# Patient Record
Sex: MALE | Race: ASIAN | NOT HISPANIC OR LATINO | Employment: FULL TIME | URBAN - METROPOLITAN AREA
[De-identification: names, ages, dates, MRNs, and addresses within clinical notes are randomized per-mention and may not be internally consistent; named-entity substitution may affect disease eponyms.]

---

## 2023-09-05 ENCOUNTER — OFFICE VISIT (OUTPATIENT)
Dept: FAMILY MEDICINE CLINIC | Facility: CLINIC | Age: 38
End: 2023-09-05
Payer: COMMERCIAL

## 2023-09-05 VITALS
OXYGEN SATURATION: 97 % | HEART RATE: 52 BPM | WEIGHT: 208 LBS | BODY MASS INDEX: 29.12 KG/M2 | HEIGHT: 71 IN | DIASTOLIC BLOOD PRESSURE: 66 MMHG | SYSTOLIC BLOOD PRESSURE: 104 MMHG | TEMPERATURE: 98.1 F

## 2023-09-05 DIAGNOSIS — E78.5 HYPERLIPIDEMIA, UNSPECIFIED HYPERLIPIDEMIA TYPE: ICD-10-CM

## 2023-09-05 DIAGNOSIS — Z00.00 ENCOUNTER FOR MEDICAL EXAMINATION TO ESTABLISH CARE: ICD-10-CM

## 2023-09-05 DIAGNOSIS — Z00.00 ANNUAL PHYSICAL EXAM: Primary | ICD-10-CM

## 2023-09-05 DIAGNOSIS — Z00.00 ENCOUNTER FOR PREVENTIVE CARE: ICD-10-CM

## 2023-09-05 PROCEDURE — 99385 PREV VISIT NEW AGE 18-39: CPT | Performed by: STUDENT IN AN ORGANIZED HEALTH CARE EDUCATION/TRAINING PROGRAM

## 2023-09-05 NOTE — PROGRESS NOTES
"Chief Complaint  Annual Exam and Establish Care    Subjective        Ian Gerardo presents to [facility]  History of Present Illness  First time to practice  Establishing medical care  Annual physical  Doesnot have any new complaint  Hx of abnormal lipid profile but was never on any meds  Currently not on any meds.  Review of system is negative for fever, headache, chest pain, shortness of breath, palpitation, nausea, vomiting, any recent change in bladder habits.      Objective   Vital Signs:  /66   Pulse 52   Temp 98.1 °F (36.7 °C) (Oral)   Ht 180.3 cm (71\")   Wt 94.3 kg (208 lb)   SpO2 97%   BMI 29.01 kg/m²   Estimated body mass index is 29.01 kg/m² as calculated from the following:    Height as of this encounter: 180.3 cm (71\").    Weight as of this encounter: 94.3 kg (208 lb).             Physical Exam  HENT:      Head: Normocephalic and atraumatic.      Mouth/Throat:      Mouth: Mucous membranes are moist.      Pharynx: Oropharynx is clear.   Eyes:      Extraocular Movements: Extraocular movements intact.      Conjunctiva/sclera: Conjunctivae normal.      Pupils: Pupils are equal, round, and reactive to light.   Cardiovascular:      Rate and Rhythm: Normal rate and regular rhythm.   Pulmonary:      Effort: Pulmonary effort is normal.      Breath sounds: Normal breath sounds.   Abdominal:      General: Bowel sounds are normal.      Palpations: Abdomen is soft.   Musculoskeletal:         General: Normal range of motion.      Cervical back: Neck supple.   Skin:     General: Skin is warm.      Capillary Refill: Capillary refill takes less than 2 seconds.   Neurological:      General: No focal deficit present.      Mental Status: He is alert and oriented to person, place, and time. Mental status is at baseline.   Psychiatric:         Mood and Affect: Mood normal.      Result Review :                   Assessment and Plan   Diagnoses and all orders for this " visit:    1. Annual physical exam (Primary)  Comments:  fasting labs ordered, will follow  Orders:  -     CBC Auto Differential  -     Comprehensive Metabolic Panel  -     Lipid Panel With / Chol / HDL Ratio  -     TSH    2. Encounter for medical examination to establish care  -     CBC Auto Differential  -     Comprehensive Metabolic Panel  -     Lipid Panel With / Chol / HDL Ratio  -     TSH    3. Hyperlipidemia, unspecified hyperlipidemia type  Comments:  lipid panel ordered, will follow, recommend lifestyle modification, low fat diet and exercise    4. Encounter for preventive care  Comments:  advise TDAp shot    Patient encouraged to partake of healthy diet rich in fresh fruits and vegetables as well as lean proteins.  Patient encouraged to participate in daily exercise with goal of 30 min sustained activity.  Wear seatbelt when driving  Flu shot annually           Follow Up   No follow-ups on file.  Patient was given instructions and counseling regarding his condition or for health maintenance advice. Please see specific information pulled into the AVS if appropriate.

## 2023-09-07 LAB
ALBUMIN SERPL-MCNC: 4.9 G/DL (ref 3.5–5.2)
ALBUMIN/GLOB SERPL: 2.1 G/DL
ALP SERPL-CCNC: 92 U/L (ref 39–117)
ALT SERPL-CCNC: 21 U/L (ref 1–41)
AST SERPL-CCNC: 20 U/L (ref 1–40)
BASOPHILS # BLD AUTO: 0.04 10*3/MM3 (ref 0–0.2)
BASOPHILS NFR BLD AUTO: 0.5 % (ref 0–1.5)
BILIRUB SERPL-MCNC: 0.7 MG/DL (ref 0–1.2)
BUN SERPL-MCNC: 15 MG/DL (ref 6–20)
BUN/CREAT SERPL: 14 (ref 7–25)
CALCIUM SERPL-MCNC: 10.1 MG/DL (ref 8.6–10.5)
CHLORIDE SERPL-SCNC: 105 MMOL/L (ref 98–107)
CHOLEST SERPL-MCNC: 180 MG/DL (ref 0–200)
CHOLEST/HDLC SERPL: 5.29 {RATIO}
CO2 SERPL-SCNC: 25.4 MMOL/L (ref 22–29)
CREAT SERPL-MCNC: 1.07 MG/DL (ref 0.76–1.27)
EGFRCR SERPLBLD CKD-EPI 2021: 91.1 ML/MIN/1.73
EOSINOPHIL # BLD AUTO: 0.24 10*3/MM3 (ref 0–0.4)
EOSINOPHIL NFR BLD AUTO: 3.1 % (ref 0.3–6.2)
ERYTHROCYTE [DISTWIDTH] IN BLOOD BY AUTOMATED COUNT: 13.2 % (ref 12.3–15.4)
GLOBULIN SER CALC-MCNC: 2.3 GM/DL
GLUCOSE SERPL-MCNC: 101 MG/DL (ref 65–99)
HCT VFR BLD AUTO: 47.5 % (ref 37.5–51)
HDLC SERPL-MCNC: 34 MG/DL (ref 40–60)
HGB BLD-MCNC: 15.7 G/DL (ref 13–17.7)
IMM GRANULOCYTES # BLD AUTO: 0.04 10*3/MM3 (ref 0–0.05)
IMM GRANULOCYTES NFR BLD AUTO: 0.5 % (ref 0–0.5)
LDLC SERPL CALC-MCNC: 127 MG/DL (ref 0–100)
LYMPHOCYTES # BLD AUTO: 2.71 10*3/MM3 (ref 0.7–3.1)
LYMPHOCYTES NFR BLD AUTO: 35.3 % (ref 19.6–45.3)
MCH RBC QN AUTO: 28.9 PG (ref 26.6–33)
MCHC RBC AUTO-ENTMCNC: 33.1 G/DL (ref 31.5–35.7)
MCV RBC AUTO: 87.3 FL (ref 79–97)
MONOCYTES # BLD AUTO: 0.59 10*3/MM3 (ref 0.1–0.9)
MONOCYTES NFR BLD AUTO: 7.7 % (ref 5–12)
NEUTROPHILS # BLD AUTO: 4.06 10*3/MM3 (ref 1.7–7)
NEUTROPHILS NFR BLD AUTO: 52.9 % (ref 42.7–76)
NRBC BLD AUTO-RTO: 0 /100 WBC (ref 0–0.2)
PLATELET # BLD AUTO: 252 10*3/MM3 (ref 140–450)
POTASSIUM SERPL-SCNC: 4.8 MMOL/L (ref 3.5–5.2)
PROT SERPL-MCNC: 7.2 G/DL (ref 6–8.5)
RBC # BLD AUTO: 5.44 10*6/MM3 (ref 4.14–5.8)
SODIUM SERPL-SCNC: 140 MMOL/L (ref 136–145)
TRIGL SERPL-MCNC: 102 MG/DL (ref 0–150)
TSH SERPL DL<=0.005 MIU/L-ACNC: 1.25 UIU/ML (ref 0.27–4.2)
VLDLC SERPL CALC-MCNC: 19 MG/DL (ref 5–40)
WBC # BLD AUTO: 7.68 10*3/MM3 (ref 3.4–10.8)

## 2024-03-05 DIAGNOSIS — E78.5 HYPERLIPIDEMIA, UNSPECIFIED HYPERLIPIDEMIA TYPE: Primary | ICD-10-CM

## 2024-03-05 LAB
ALBUMIN SERPL-MCNC: 4.6 G/DL (ref 3.5–5.2)
ALBUMIN/GLOB SERPL: 1.9 G/DL
ALP SERPL-CCNC: 100 U/L (ref 39–117)
ALT SERPL-CCNC: 29 U/L (ref 1–41)
AST SERPL-CCNC: 21 U/L (ref 1–40)
BILIRUB SERPL-MCNC: 0.7 MG/DL (ref 0–1.2)
BUN SERPL-MCNC: 13 MG/DL (ref 6–20)
BUN/CREAT SERPL: 11.3 (ref 7–25)
CALCIUM SERPL-MCNC: 9.6 MG/DL (ref 8.6–10.5)
CHLORIDE SERPL-SCNC: 105 MMOL/L (ref 98–107)
CHOLEST SERPL-MCNC: 198 MG/DL (ref 0–200)
CHOLEST/HDLC SERPL: 5.82 {RATIO}
CO2 SERPL-SCNC: 22.8 MMOL/L (ref 22–29)
CREAT SERPL-MCNC: 1.15 MG/DL (ref 0.76–1.27)
EGFRCR SERPLBLD CKD-EPI 2021: 83.5 ML/MIN/1.73
GLOBULIN SER CALC-MCNC: 2.4 GM/DL
GLUCOSE SERPL-MCNC: 103 MG/DL (ref 65–99)
HDLC SERPL-MCNC: 34 MG/DL (ref 40–60)
LDLC SERPL CALC-MCNC: 143 MG/DL (ref 0–100)
POTASSIUM SERPL-SCNC: 4.3 MMOL/L (ref 3.5–5.2)
PROT SERPL-MCNC: 7 G/DL (ref 6–8.5)
SODIUM SERPL-SCNC: 141 MMOL/L (ref 136–145)
TRIGL SERPL-MCNC: 113 MG/DL (ref 0–150)
VLDLC SERPL CALC-MCNC: 21 MG/DL (ref 5–40)

## 2024-03-12 ENCOUNTER — OFFICE VISIT (OUTPATIENT)
Dept: FAMILY MEDICINE CLINIC | Facility: CLINIC | Age: 39
End: 2024-03-12
Payer: COMMERCIAL

## 2024-03-12 VITALS
TEMPERATURE: 98 F | RESPIRATION RATE: 14 BRPM | HEIGHT: 71 IN | WEIGHT: 212.1 LBS | OXYGEN SATURATION: 99 % | DIASTOLIC BLOOD PRESSURE: 70 MMHG | HEART RATE: 53 BPM | SYSTOLIC BLOOD PRESSURE: 110 MMHG | BODY MASS INDEX: 29.69 KG/M2

## 2024-03-12 DIAGNOSIS — R73.09 ABNORMAL GLUCOSE: ICD-10-CM

## 2024-03-12 DIAGNOSIS — H66.90 ACUTE OTITIS MEDIA, UNSPECIFIED OTITIS MEDIA TYPE: Primary | ICD-10-CM

## 2024-03-12 DIAGNOSIS — E78.5 HYPERLIPIDEMIA, UNSPECIFIED HYPERLIPIDEMIA TYPE: ICD-10-CM

## 2024-03-12 PROCEDURE — 99214 OFFICE O/P EST MOD 30 MIN: CPT | Performed by: STUDENT IN AN ORGANIZED HEALTH CARE EDUCATION/TRAINING PROGRAM

## 2024-03-12 RX ORDER — AMOXICILLIN 875 MG/1
875 TABLET, COATED ORAL 2 TIMES DAILY
Qty: 14 TABLET | Refills: 0 | Status: SHIPPED | OUTPATIENT
Start: 2024-03-12

## 2024-03-12 NOTE — PROGRESS NOTES
"Chief Complaint  Follow up labs    Subjective        Ian Gerardo presents to [facility]  History of Present Illness  For follow up on hyperlipidemia and hyperglycemia  For b/l mild ear discomfort.  Currently not on any medication, made some dietary changes in last 6 months  Review of system is negative for fever, headache, chest pain, shortness of breath, palpitation, nausea, vomiting, any recent change in bladder habits.    Objective   Vital Signs:  /70 (BP Location: Left arm, Patient Position: Sitting, Cuff Size: Adult)   Pulse 53   Temp 98 °F (36.7 °C) (Temporal)   Resp 14   Ht 180.3 cm (70.98\")   Wt 96.2 kg (212 lb 1.6 oz)   SpO2 99%   BMI 29.60 kg/m²   Estimated body mass index is 29.6 kg/m² as calculated from the following:    Height as of this encounter: 180.3 cm (70.98\").    Weight as of this encounter: 96.2 kg (212 lb 1.6 oz).       BMI is >= 25 and <30. (Overweight) The following options were offered after discussion;: exercise counseling/recommendations and nutrition counseling/recommendations      Physical Exam  HENT:      Head: Normocephalic and atraumatic.      Right Ear: There is no impacted cerumen.      Left Ear: There is no impacted cerumen.      Ears:      Comments: TM opaqueness  present b/l     Mouth/Throat:      Mouth: Mucous membranes are moist.      Pharynx: Oropharynx is clear.   Eyes:      Extraocular Movements: Extraocular movements intact.      Conjunctiva/sclera: Conjunctivae normal.      Pupils: Pupils are equal, round, and reactive to light.   Cardiovascular:      Rate and Rhythm: Normal rate and regular rhythm.   Pulmonary:      Effort: Pulmonary effort is normal.      Breath sounds: Normal breath sounds.   Abdominal:      General: Bowel sounds are normal.      Palpations: Abdomen is soft.   Musculoskeletal:         General: Normal range of motion.      Cervical back: Neck supple.   Skin:     General: Skin is warm.      Capillary " Refill: Capillary refill takes less than 2 seconds.   Neurological:      General: No focal deficit present.      Mental Status: He is alert and oriented to person, place, and time. Mental status is at baseline.   Psychiatric:         Mood and Affect: Mood normal.        Result Review :    The following data was reviewed by: Munira Shelton MD on 03/12/2024:  CMP          9/7/2023    09:26 3/5/2024    09:03   CMP   Glucose 101  103    BUN 15  13    Creatinine 1.07  1.15    Sodium 140  141    Potassium 4.8  4.3    Chloride 105  105    Calcium 10.1  9.6    Total Protein 7.2  7.0    Albumin 4.9  4.6    Globulin 2.3  2.4    Total Bilirubin 0.7  0.7    Alkaline Phosphatase 92  100    AST (SGOT) 20  21    ALT (SGPT) 21  29    BUN/Creatinine Ratio 14.0  11.3      CBC          9/7/2023    09:26   CBC   WBC 7.68    RBC 5.44    Hemoglobin 15.7    Hematocrit 47.5    MCV 87.3    MCH 28.9    MCHC 33.1    RDW 13.2    Platelets 252      Lipid Panel          9/7/2023    09:26 3/5/2024    09:03   Lipid Panel   Total Cholesterol 180  198    Triglycerides 102  113    HDL Cholesterol 34  34    VLDL Cholesterol 19  21    LDL Cholesterol  127  143      TSH          9/7/2023    09:26   TSH   TSH 1.250                   Assessment and Plan     Diagnoses and all orders for this visit:    1. Acute otitis media, unspecified otitis media type (Primary)  -     amoxicillin (AMOXIL) 875 MG tablet; Take 1 tablet by mouth 2 (Two) Times a Day.  Dispense: 14 tablet; Refill: 0  -     CBC Auto Differential; Future  -     Comprehensive Metabolic Panel; Future  -     Lipid Panel With / Chol / HDL Ratio; Future  -     TSH; Future  -     Urinalysis With Microscopic - Urine, Clean Catch; Future    2. Hyperlipidemia, unspecified hyperlipidemia type  -     CBC Auto Differential; Future  -     Comprehensive Metabolic Panel; Future  -     Lipid Panel With / Chol / HDL Ratio; Future  -     TSH; Future  -     Urinalysis With Microscopic - Urine, Clean Catch;  Future    3. Abnormal glucose  -     CBC Auto Differential; Future  -     Comprehensive Metabolic Panel; Future  -     Lipid Panel With / Chol / HDL Ratio; Future  -     TSH; Future  -     Urinalysis With Microscopic - Urine, Clean Catch; Future    # Hyperlipidemia  Lipid panel reviewed, LDL has gone slightly worse from 127 to 143, not on any medication, continue lifestyle modification which includes diet and exercise  Will repeat lipid panel in 6 months  #Hyperglycemia  Fasting glucose ~103, stable  Encourage low carb diet and exercise  RTC in 6 months for physical with labs  # acute otitis media  Advise pt to take antibiotics for 7-10 days.         Follow Up     No follow-ups on file.  Patient was given instructions and counseling regarding his condition or for health maintenance advice. Please see specific information pulled into the AVS if appropriate.

## 2024-06-17 ENCOUNTER — OFFICE VISIT (OUTPATIENT)
Dept: FAMILY MEDICINE CLINIC | Facility: CLINIC | Age: 39
End: 2024-06-17
Payer: COMMERCIAL

## 2024-06-17 VITALS
DIASTOLIC BLOOD PRESSURE: 80 MMHG | WEIGHT: 212.2 LBS | SYSTOLIC BLOOD PRESSURE: 128 MMHG | HEART RATE: 66 BPM | TEMPERATURE: 98.3 F | BODY MASS INDEX: 29.71 KG/M2 | OXYGEN SATURATION: 99 % | RESPIRATION RATE: 16 BRPM | HEIGHT: 71 IN

## 2024-06-17 DIAGNOSIS — R73.09 ABNORMAL GLUCOSE: ICD-10-CM

## 2024-06-17 DIAGNOSIS — E78.5 HYPERLIPIDEMIA, UNSPECIFIED HYPERLIPIDEMIA TYPE: Primary | ICD-10-CM

## 2024-06-17 PROCEDURE — 99213 OFFICE O/P EST LOW 20 MIN: CPT | Performed by: STUDENT IN AN ORGANIZED HEALTH CARE EDUCATION/TRAINING PROGRAM

## 2024-06-22 NOTE — PROGRESS NOTES
"Chief Complaint  Follow-up (PAPERWORK TO BE FILLED OUT PER PT BY PCP.)    Subjective        Ian Gerardo presents to [facility]  History of Present Illness  For filling for few documents for legal purposes. Doesnot have any complaints today.  Review of system is negative for fever, headache, chest pain, shortness of breath, palpitation, nausea, vomiting, any recent change in bladder habits.    Objective   Vital Signs:  /80 (BP Location: Left arm, Patient Position: Sitting, Cuff Size: Adult)   Pulse 66   Temp 98.3 °F (36.8 °C) (Oral)   Resp 16   Ht 180.3 cm (71\")   Wt 96.3 kg (212 lb 3.2 oz)   SpO2 99%   BMI 29.60 kg/m²   Estimated body mass index is 29.6 kg/m² as calculated from the following:    Height as of this encounter: 180.3 cm (71\").    Weight as of this encounter: 96.3 kg (212 lb 3.2 oz).               Physical Exam  Cardiovascular:      Rate and Rhythm: Normal rate.      Pulses: Normal pulses.      Heart sounds: Normal heart sounds.   Pulmonary:      Effort: Pulmonary effort is normal.      Breath sounds: Normal breath sounds.   Abdominal:      General: Bowel sounds are normal.      Palpations: Abdomen is soft.   Musculoskeletal:         General: Normal range of motion.   Skin:     General: Skin is warm.   Neurological:      Mental Status: He is alert and oriented to person, place, and time.        Result Review :    The following data was reviewed by: Munira Shelton MD on 06/17/2024:  CMP          9/7/2023    09:26 3/5/2024    09:03   CMP   Glucose 101  103    BUN 15  13    Creatinine 1.07  1.15    Sodium 140  141    Potassium 4.8  4.3    Chloride 105  105    Calcium 10.1  9.6    Total Protein 7.2  7.0    Albumin 4.9  4.6    Globulin 2.3  2.4    Total Bilirubin 0.7  0.7    Alkaline Phosphatase 92  100    AST (SGOT) 20  21    ALT (SGPT) 21  29    BUN/Creatinine Ratio 14.0  11.3      CBC          9/7/2023    09:26   CBC   WBC 7.68    RBC 5.44  "   Hemoglobin 15.7    Hematocrit 47.5    MCV 87.3    MCH 28.9    MCHC 33.1    RDW 13.2    Platelets 252      Lipid Panel          9/7/2023    09:26 3/5/2024    09:03   Lipid Panel   Total Cholesterol 180  198    Triglycerides 102  113    HDL Cholesterol 34  34    VLDL Cholesterol 19  21    LDL Cholesterol  127  143      TSH          9/7/2023    09:26   TSH   TSH 1.250                   Assessment and Plan     Diagnoses and all orders for this visit:    1. Hyperlipidemia, unspecified hyperlipidemia type (Primary)    2. Abnormal glucose      Not on any medication for these problems, continue lifestyle modifications which includes diet and exercise  Forms filled and scanned to chart       Follow Up     No follow-ups on file.  Patient was given instructions and counseling regarding his condition or for health maintenance advice. Please see specific information pulled into the AVS if appropriate.

## 2025-03-18 ENCOUNTER — OFFICE VISIT (OUTPATIENT)
Dept: FAMILY MEDICINE CLINIC | Facility: CLINIC | Age: 40
End: 2025-03-18
Payer: COMMERCIAL

## 2025-03-18 VITALS
HEIGHT: 71 IN | HEART RATE: 62 BPM | OXYGEN SATURATION: 98 % | TEMPERATURE: 98.2 F | DIASTOLIC BLOOD PRESSURE: 78 MMHG | BODY MASS INDEX: 30.34 KG/M2 | SYSTOLIC BLOOD PRESSURE: 126 MMHG | WEIGHT: 216.7 LBS | RESPIRATION RATE: 16 BRPM

## 2025-03-18 DIAGNOSIS — E78.5 HYPERLIPIDEMIA, UNSPECIFIED HYPERLIPIDEMIA TYPE: ICD-10-CM

## 2025-03-18 DIAGNOSIS — R73.09 ABNORMAL GLUCOSE: ICD-10-CM

## 2025-03-18 DIAGNOSIS — Z00.00 ANNUAL PHYSICAL EXAM: Primary | ICD-10-CM

## 2025-03-18 PROCEDURE — 99395 PREV VISIT EST AGE 18-39: CPT | Performed by: STUDENT IN AN ORGANIZED HEALTH CARE EDUCATION/TRAINING PROGRAM

## 2025-03-27 NOTE — PROGRESS NOTES
"Chief Complaint  Annual Exam and Abnormal Lab (Drawn on 03/14/2025.)    Subjective    History of Present Illness  The patient is a 39-year-old male who presents for a physical exam.    He has observed an increase in his triglyceride levels compared to previous readings. He admits to consuming alcohol almost every other day. His diet includes whole milk, with a daily intake of approximately 200 mL in the morning and 100 mL in the evening.    He has been monitoring his blood glucose levels at home, which have consistently ranged between 95 and 99, occasionally spiking to 110. His most recent lab result indicated a blood glucose level of 103, while the previous reading was 101. He did not fast prior to these lab tests.    A few weeks ago, he experienced symptoms suggestive of influenza, including sore throat, headache, body aches, chills, and a runny nose with watery mucus. These symptoms were transient, lasting only a few days. He also reported mild ear pain during this period, which has since resolved.    He reports no current ear issues, abdominal pain, axillary or groin lumps, ankle swelling, or open wounds.    SOCIAL HISTORY  He does not smoke. He admits to drinking alcohol almost every other day.       Ian Gerardo presents to [facility]  Abnormal Lab      Objective   Vital Signs:  /78 (BP Location: Left arm, Patient Position: Sitting, Cuff Size: Adult)   Pulse 62   Temp 98.2 °F (36.8 °C) (Oral)   Resp 16   Ht 180.3 cm (71\")   Wt 98.3 kg (216 lb 11.2 oz)   SpO2 98%   BMI 30.22 kg/m²   Estimated body mass index is 30.22 kg/m² as calculated from the following:    Height as of this encounter: 180.3 cm (71\").    Weight as of this encounter: 98.3 kg (216 lb 11.2 oz).    BMI is >= 30 and <35. (Class 1 Obesity). The following options were offered after discussion;: exercise counseling/recommendations and nutrition counseling/recommendations      Physical Exam  HENT: "      Head: Normocephalic and atraumatic.      Mouth/Throat:      Mouth: Mucous membranes are moist.      Pharynx: Oropharynx is clear.   Eyes:      Extraocular Movements: Extraocular movements intact.      Conjunctiva/sclera: Conjunctivae normal.      Pupils: Pupils are equal, round, and reactive to light.   Cardiovascular:      Rate and Rhythm: Normal rate and regular rhythm.   Pulmonary:      Effort: Pulmonary effort is normal.      Breath sounds: Normal breath sounds.   Abdominal:      General: Bowel sounds are normal.      Palpations: Abdomen is soft.   Musculoskeletal:         General: Normal range of motion.      Cervical back: Neck supple.   Skin:     General: Skin is warm.      Capillary Refill: Capillary refill takes less than 2 seconds.   Neurological:      General: No focal deficit present.      Mental Status: He is alert and oriented to person, place, and time. Mental status is at baseline.   Psychiatric:         Mood and Affect: Mood normal.        Result Review :  The following data was reviewed by: Munira Shelton MD on 03/18/2025:  CMP          3/14/2025    15:39   CMP   Glucose 103    BUN 17    Creatinine 0.95    EGFR 104    Sodium 140    Potassium 4.7    Chloride 103    Calcium 9.2    Total Protein 7.0    Albumin 4.4    Globulin 2.6    Total Bilirubin 0.2    Alkaline Phosphatase 91    AST (SGOT) 24    ALT (SGPT) 31    BUN/Creatinine Ratio 18      CBC          3/14/2025    15:39   CBC   WBC 8.3    RBC 5.12    Hemoglobin 15.1    Hematocrit 45.9    MCV 90    MCH 29.5    MCHC 32.9    RDW 12.6    Platelets 358      Lipid Panel          3/14/2025    15:39   Lipid Panel   Total Cholesterol 191    Triglycerides 356    HDL Cholesterol 27    VLDL Cholesterol 61    LDL Cholesterol  103      TSH          3/14/2025    15:39   TSH   TSH 1.830                Assessment and Plan   Diagnoses and all orders for this visit:    1. Annual physical exam (Primary)  -     Hemoglobin A1c; Future  -     CBC Auto  Differential; Future  -     Comprehensive Metabolic Panel; Future  -     Lipid Panel With / Chol / HDL Ratio; Future  -     TSH; Future  -     Urinalysis With Microscopic - Urine, Clean Catch; Future    2. Hyperlipidemia, unspecified hyperlipidemia type    3. Abnormal glucose  -     Hemoglobin A1c; Future        Assessment & Plan  1. Elevated cholesterol levels.  His triglyceride levels are elevated, while his HDL levels are low and LDL levels are high, although not as high as the previous year. The patient was informed that unless triglycerides exceed 500, medication is not typically prescribed. The potential causes of elevated triglycerides, including high carbohydrate intake and alcohol consumption, were discussed. He was advised to reduce his alcohol intake and make dietary modifications, such as replacing whole milk with 2% or less fat milk. He was also encouraged to increase his physical activity, particularly walking. A repeat lipid panel will be ordered in 1 year.    2. Elevated blood glucose levels.  His blood glucose levels have consistently been above 100, with the most recent reading being 103. He was advised to reduce his carbohydrate intake, increase physical activity, and aim for weight loss. An A1c test will be added to his next year's physical examination.    3. Recent viral infection.  He reported symptoms of a sore throat, headache, body pain, and runny nose, which resolved within a few days. It was discussed that these symptoms were likely due to a low-level viral infection. He was advised that antibiotics are not necessary for viral infections and that over-the-counter medications like ibuprofen or Tylenol can be used for symptom relief. If symptoms persist or worsen, he should seek medical attention.    4. Health maintenance.  His lymphocyte count is slightly elevated but remains below 4, which does not raise immediate concern. Thyroid function, kidney function, electrolyte levels, liver  function, and urinalysis results are all within normal limits. He has gained a few pounds, resulting in a slightly elevated BMI. He was advised to receive the influenza vaccine in October 2024. A repeat lymphocyte count will be ordered for next year. He was also advised to schedule his annual physical examination and fasting labs for the following year.     Patient encouraged to partake of healthy diet rich in fresh fruits and vegetables as well as lean proteins.  Patient encouraged to participate in daily exercise with goal of 30 min sustained activity.  Wear seatbelt when driving  Flu shot annually         Follow Up   No follow-ups on file.  Patient was given instructions and counseling regarding his condition or for health maintenance advice. Please see specific information pulled into the AVS if appropriate.     Patient or patient representative verbalized consent for the use of Ambient Listening during the visit with  Munira Shelton MD for chart documentation. 3/27/2025  13:18 EDT